# Patient Record
(demographics unavailable — no encounter records)

---

## 2025-04-22 NOTE — DISCUSSION/SUMMARY
[FreeTextEntry1] :  Patient to follow up in 1 year for annual GYN exam Mammogram and bilateral breast US due: 1/2026; Rx given Colonoscopy due: 07/2027 per Fried  Bone density due: now; Rx given. Will call with results may need to start treatment. Pap ordered Hemoccult ordered All questions answered, patient is agreeable with plan. PMB precautions reviewed vaginal lubricants PRN   PETE CARVER am scribing for the presence of Dr. Goncalves the following sections HISTORY OF PRESENT ILLNESS, PAST MEDICAL/FAMILY/SOCIAL HISTORY; REVIEW OF SYSTEMS; VITAL SIGNS; PHYSICAL EXAM; DISPOSITION.    I personally performed the services described in the documentation, reviewed the documentation recorded by the scribe in my presence and it accurately and completely records my words and actions.

## 2025-04-22 NOTE — HISTORY OF PRESENT ILLNESS
[FreeTextEntry1] : 71 year old female postmenopausal female who presents today for a routine GYN exam. The patient has no current GYN complaints and denies any postmenopausal bleeding. No changes to bladder or bowel pattern. The patient denies abdominal pain or bloating. Bone density last completed 2023 shows osteopenia and started OTC calcitriol and vitamin D.  She exercises regularly. 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